# Patient Record
Sex: MALE | Race: WHITE | NOT HISPANIC OR LATINO | Employment: OTHER | URBAN - METROPOLITAN AREA
[De-identification: names, ages, dates, MRNs, and addresses within clinical notes are randomized per-mention and may not be internally consistent; named-entity substitution may affect disease eponyms.]

---

## 2021-10-05 ENCOUNTER — TRANSFERRED RECORDS (OUTPATIENT)
Dept: HEALTH INFORMATION MANAGEMENT | Facility: CLINIC | Age: 71
End: 2021-10-05

## 2021-10-13 ENCOUNTER — TRANSCRIBE ORDERS (OUTPATIENT)
Dept: OTHER | Age: 71
End: 2021-10-13

## 2021-10-13 DIAGNOSIS — D03.9 MELANOMA IN SITU (H): Primary | ICD-10-CM

## 2021-10-14 ENCOUNTER — TELEPHONE (OUTPATIENT)
Dept: DERMATOLOGY | Facility: CLINIC | Age: 71
End: 2021-10-14

## 2021-10-28 NOTE — TELEPHONE ENCOUNTER
FUTURE VISIT INFORMATION      FUTURE VISIT INFORMATION:    Date: 11.3.21    Time: 1 PM    Location:  Derm  REFERRAL INFORMATION:    Referring provider:  Og    Referring providers clinic:  [facility]    Reason for visit/diagnosis  Melanoma in situ    RECORDS REQUESTED FROM:       Clinic name Comments Records Status Imaging Status   [facility] 10.13.21 Og Received                                    Received records from [facility]. Sent to urgent scanning.

## 2021-11-03 ENCOUNTER — OFFICE VISIT (OUTPATIENT)
Dept: DERMATOLOGY | Facility: CLINIC | Age: 71
End: 2021-11-03
Payer: MEDICARE

## 2021-11-03 ENCOUNTER — PRE VISIT (OUTPATIENT)
Dept: DERMATOLOGY | Facility: CLINIC | Age: 71
End: 2021-11-03

## 2021-11-03 DIAGNOSIS — D03.62 MELANOMA IN SITU OF LEFT SHOULDER (H): Primary | ICD-10-CM

## 2021-11-03 PROCEDURE — 99203 OFFICE O/P NEW LOW 30 MIN: CPT | Mod: 57 | Performed by: DERMATOLOGY

## 2021-11-03 RX ORDER — AMLODIPINE BESYLATE 5 MG/1
TABLET ORAL
COMMUNITY
Start: 2021-10-11

## 2021-11-03 ASSESSMENT — PAIN SCALES - GENERAL: PAINLEVEL: NO PAIN (0)

## 2021-11-03 NOTE — LETTER
11/3/2021       RE: Adam Birmingham  [address] [place] [facility] [address]     Dear Colleague,    Thank you for referring your patient, Adam Birmingham, to the [facility] DERMATOLOGIC SURGERY CLINIC [place] at [facility]. Please see a copy of my visit note below.    Mohs Micrographic Surgery Consult Note    Nov 3, 2021    Dermatology Problem List:  #. 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    Skin cancer(s): Melanoma in situ  Location(s): Left anterior shoulder  Associated symptoms: pruritus, tenderness to touch  Onset: within last 1 year    No other associated symptoms, modifying factors, or prior treatments, except when noted above. The patient denies any constitutional symptoms, lymphadenopathy, unintentional weight loss or decreased appetite. No other skin concerns today.    Objective:   Gen: This is a well appearing individual in no acute distress. The patient is alert and oriented x 3.  An exam of the left shoulder was performed today and visualized the following:  - At the left shoulder, there is a well healed surgical scar with some overlying pigmented papules.     Assessment and Plan:     #. 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  Positive margins on WLE.  Meets Mohs AUC.  1. Plan for Mohs micrographic surgery for skin cancer(s) above:  - We discussed the nature of the diagnosis/condition above. We discussed the treatment options, including the risks benefits and expectations of these options. We recommend micrographic surgery as the most effective and most tissue sparing option for treatment, and the patient agrees to proceed with this.  The patient is aware of the risks, benefits and expectations of this procedure. The patient will be scheduled for this procedure, if not  already done so.  - We anticipate the following closure type: Linear closure    The patient was discussed with and evaluated by attending physician, Hiram Ponce MD.    Niles Davenport MD  Micrographic Surgery and Dermatologic Oncology (MSDO) Fellow    Scribe Disclosure:  I, Ramya Richards, am serving as a scribe to document services personally performed by Hiram Ponce MD based on data collection and the provider's statements to me.     Attending Attestation  I attest that the Fellow recorded the interview and exam that I personally performed.  I have reviewed the note and edited it as necessary.    Hiram Ponce M.D.  Professor  Director of Dermatologic Surgery  Department of Dermatology  [facility]          Again, thank you for allowing me to participate in the care of your patient.      Sincerely,    Hiram Ponce MD

## 2021-11-03 NOTE — PROGRESS NOTES
Mohs Micrographic Surgery Consult Note    Nov 3, 2021    Dermatology Problem List:  #. 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    Skin cancer(s): Melanoma in situ  Location(s): Left anterior shoulder  Associated symptoms: pruritus, tenderness to touch  Onset: within last 1 year    No other associated symptoms, modifying factors, or prior treatments, except when noted above. The patient denies any constitutional symptoms, lymphadenopathy, unintentional weight loss or decreased appetite. No other skin concerns today.    Objective:   Gen: This is a well appearing individual in no acute distress. The patient is alert and oriented x 3.  An exam of the left shoulder was performed today and visualized the following:  - At the left shoulder, there is a well healed surgical scar with some overlying pigmented papules.     Assessment and Plan:     #. 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  Positive margins on WLE.  Meets Mohs AUC.  1. Plan for Mohs micrographic surgery for skin cancer(s) above:  - We discussed the nature of the diagnosis/condition above. We discussed the treatment options, including the risks benefits and expectations of these options. We recommend micrographic surgery as the most effective and most tissue sparing option for treatment, and the patient agrees to proceed with this.  The patient is aware of the risks, benefits and expectations of this procedure. The patient will be scheduled for this procedure, if not already done so.  - We anticipate the following closure type: Linear closure    The patient was discussed with and evaluated by attending physician, Hiram Ponce MD.    Niles Davenport MD  Micrographic Surgery and Dermatologic Oncology (MSDO) Fellow    Scribe Disclosure:  Ramya KAPLAN, am serving as a scribe to document services  personally performed by Hiram Ponce MD based on data collection and the provider's statements to me.     Attending Attestation  I attest that the Fellow recorded the interview and exam that I personally performed.  I have reviewed the note and edited it as necessary.    Hiram Ponce M.D.  Professor  Director of Dermatologic Surgery  Department of Dermatology  [facility]

## 2021-11-03 NOTE — NURSING NOTE
Dermatology Rooming Note    Adam Birmingham's goals for this visit include:   Chief Complaint   Patient presents with     Derm Problem     Adam is here today for a consult for a melanoma in situ on his left anterior shoulder.      Susan Sweet CMA

## 2021-11-03 NOTE — LETTER
Date:November 7, 2021      Patient was self referred, no letter generated. Do not send.        [facility] Health Information

## 2021-11-17 ENCOUNTER — OFFICE VISIT (OUTPATIENT)
Dept: DERMATOLOGY | Facility: CLINIC | Age: 71
End: 2021-11-17
Payer: MEDICARE

## 2021-11-17 VITALS — HEART RATE: 90 BPM | DIASTOLIC BLOOD PRESSURE: 74 MMHG | SYSTOLIC BLOOD PRESSURE: 143 MMHG

## 2021-11-17 DIAGNOSIS — D03.62 MELANOMA IN SITU OF LEFT UPPER EXTREMITY INCLUDING SHOULDER (H): Primary | ICD-10-CM

## 2021-11-17 PROCEDURE — 88305 TISSUE EXAM BY PATHOLOGIST: CPT | Mod: 26 | Performed by: DERMATOLOGY

## 2021-11-17 PROCEDURE — 12034 INTMD RPR S/TR/EXT 7.6-12.5: CPT | Mod: GC | Performed by: DERMATOLOGY

## 2021-11-17 PROCEDURE — 88305 TISSUE EXAM BY PATHOLOGIST: CPT | Mod: TC | Performed by: DERMATOLOGY

## 2021-11-17 PROCEDURE — 17315 MOHS SURG ADDL BLOCK: CPT | Mod: GC | Performed by: DERMATOLOGY

## 2021-11-17 PROCEDURE — 17313 MOHS 1 STAGE T/A/L: CPT | Mod: GC | Performed by: DERMATOLOGY

## 2021-11-17 ASSESSMENT — PAIN SCALES - GENERAL: PAINLEVEL: NO PAIN (0)

## 2021-11-17 NOTE — NURSING NOTE
Dermatology Rooming Note    Adam Birmingham's goals for this visit include:   Chief Complaint   Patient presents with     Derm Problem     Adam is here today for a melanoma on the left shoulder     Scott Almanzar, JOEY

## 2021-11-17 NOTE — PROGRESS NOTES
[facility] Mohs Surgery Procedure Note    Case #: 1  Date of Service:  Nov 17, 2021  Surgery: Mohs micrographic surgery (MMS)  Staff surgeon: Hiram Ponce MD  Fellow surgeon: Niles Davenport MD  Resident surgeon: Crista Greenberg MD  Nurse: Jeannette Carrillo RN    Tumor Type: Melanoma in situ  Location: Left anterior shoulder  Derm-Path Accession #: Outside path [facility]     Mohs Accession #:   Pre-Op Size: 9.5 cm x 2.2 cm  Final Defect Size: 10.0 cm x 4.0 cm  Number of Mohs stages: 1  Level of Defect: Fat  Local anesthetic: 21 mL 1% lidocaine with epinephrine  Repair Type: Intermediate repair  Repair Size: 12.5 cm  Suture Material: 3-0 Vicryl; 4-0 Monocryl    Procedure:    Stage I  We discussed the principles of treatment and most likely complications including scarring, bleeding, infection, swelling, pain, crusting, nerve damage, large wound,  incomplete excision, wound dehiscence,  nerve damage, recurrence, and a second procedure may be recommended to obtain the best cosmetic or functional result.    Informed consent was obtained and the patient underwent the procedure as follows:  The patient was placed supine on the operating table.  The cancer was identified, outlined with a marker, and verified by the patient.  The entire surgical field was prepped with chlorhexidine.  The surgical site was anesthetized using lidocaine with epinephrine.    The area of clinically apparent tumor was debulked. The layer of tissue was then surgically excised using a #15 blade and was then transferred onto a specimen sheet maintaining the orientation of the specimen. Hemostasis was obtained using electrocoagulation. The wound site was then covered with a dressing while the tissue samples were processed for examination.    The excised tissue was transported to the Mohs histology laboratory maintaining the tissue orientation.  The tissue specimen was relaxed so that the entire surgical margin was in a a  single horizontal plane for sectioning and inked for precise mapping.  A precise reference map was drawn to reflect the sectioning of the specimen, colored inking of the margins, and orientation on the patient.  The tissue was processed using horizontal sectioning of the base and continuous peripheral margins.  The histopathologic sections were reviewed in conjunction with the reference map.    Total blocks: 8  Total slides:  40    There were no cancer cells visualized on examination, therefore Mohs surgery was complete.    REPAIR: An intermediate layered linear closure was selected as the procedure which would maximally preserve both function and cosmesis.    After the excision of the tumor, the area was carefully undermined. Hemostasis was obtained with electrocoagulation.  Closure was oriented so that the wound was parallel to the patient's relaxed skin tension lines.   The subcutaneous and dermal layers were then closed with 3-0 Vicryl sutures. The epidermis was then carefully approximated along the length of the wound using 4-0 Monocryl simple running sutures.     Estimated blood loss was less than 10 ml for all surgical sites. A sterile pressure dressing was applied and wound care instructions, with a written handout, were given. The patient was discharged from the [facility] Center alert and ambulatory.    Follow-up for suture removal: Not applicable as only dissolving sutures used    Hiram Ponce MD was immediately available for the entire surgery and was physicially present for the key portions of the procedure.    Dr. Niles Davenport (Mohs micrographic surgery fellow) performed the micrographic surgery; and Hiram Ponce MD performed the reconstruction/repair and was present for the entire micrographic surgery and always immediately available.    Scribe Disclosure:  Viktoriya KAPLAN, am serving as a scribe to document services personally performed by Hiram Ponce MD based on data collection and the  provider's statements to me.

## 2021-11-17 NOTE — PATIENT INSTRUCTIONS
Wound care    I will experience scar, bleeding, swelling, pain, crusting and redness. I may experience incomplete removal or recurrence. Risks are bleeding, bruising, swelling, infection, nerve damage, & a large wound. A second procedure may be recommended to obtain the best cosmetic or functional result.       A three month office visit with your Surgeon is recommended for scar evaluation. Please reach out sooner if you have concerns about you surgical site/wound.    Caring for your skin after surgery    After your surgery, a pressure bandage will be placed over the area that has stitches. This is important to prevent bleeding. Please follow these instructions over the next 1 to 2 weeks. Following this regimen will help to prevent complications as your wound heals.     For the first 48 hours after your surgery:      Leave the pressure dressing on and keep it dry. If it should come loose, you may re-tape it, but do not take it off.    Relax and take it easy. Do not do any vigorous exercise or heavy lifting. This could cause the wound to bleed.    Post-Operative pain is usually mild. If you are able to take tylenol, You may take plain or extra-strength Tylenol (acetaminophen) As directed on the bottle (do not take more than 4,000mg in one day). If you are able to take ibuprofen, you can alternate the tylenol and ibuprofen.     Avoid alcohol as this may increase your tendency to bleed.     You may put an ice pack around the bandaged area for 20 minutes at a time as needed. This may help reduce swelling, bruising, and pain. Make sure the ice pack is waterproof so that the pressure bandage doesn t get wet.    If the wound is on the face try to sleep with your head elevated. Either in a recliner or propped up in bed, this will decrease swelling around the eyes.     You may see a small amount of drainage or blood on your pressure bandage. This is normal. However:  o If drainage or bleeding continues or saturates the  bandage, you will need to apply firm pressure over the bandage with a piece of gauze for 15 minutes.  o If bleeding continues after applying pressure for 15 minutes, apply an ice pack to the bandaged area for 15 minutes.  o If bleeding still continues, call our office or go to the nearest emergency room.    Remove pressure dressing 48 hours after surgery:      Carefully remove the pressure bandage. If it seems sticky or too difficult to get off, you may need to soak it off in the shower.    After the pressure dressing is removed, you may shower and get the wound wet. However, Do Not let the forceful stream of the shower hit the wound directly.    Follow these wound care and dressing change instructions:  o  In the shower was the surgical site last with its own separate was cloth.  o You may allow water to run over the site. Take a clean wash cloth wet with soapy warm water and gently pat the suture site to help remove any crust or drainage.   o Do Not rub or scrub the site    o After site is clean pat dry and apply a thin layer of Vaseline ointment  over the suture site with a cotton swab or clean finger.   o Cover the suture site with Telfa (non-stick) dressing. You may tape a piece of gauze over the Telfa for extra protection if you wish.  o Continue wound care at least once a day, twice if you are active or around a contaminated environment.  o Continue daily wound care until your surgical site is completely healed. To determine this, around 2 weeks post procedure you can take a cotton swab with a small amount of Hydrogen peroxide and roll it on the suture site. If the site bubbles and turns white your wound is still healing. Please continue wound care until the area no longer bubbles up from the hydrogen peroxide.   o Dissolving stitches, if you have been told your stitches are dissolving they should dissolve in one to one and a half week. If the stiches do not dissolve by one and a half week you can use a Qtip  with hydrogen peroxide on it and roll it along the suture line to help the stitches dissolve and come out. Please give us a call if stitches are still in after two weeks. If you do not keep your wound moist at all times while it heals the dissolving sutures will dry out and not dissolve.         Follow up will be a 3 month scar evaluation either in person or via a telephone visit with you sending in a photo via [employer]. Unless you have been told to follow up sooner or if you have concerns and would like to be see sooner. Please call or send us in a [employer] message if possible and attach a photo.        What to expect:      The first couple of days your wound may be tender and may bleed slightly when doing wound care.    There may be swelling and bruising around the wound, especially if it is near the eyes. For your comfort, you may apply ice or cold compresses to the bruises after your have removed the pressure bandage.    The area around your wound may be numb for several weeks or even months.    You may experience periodic sharp pain or mild itching around the wound as it heals.     The suture line will look dark for a while but will lighten over time.       When to call us:      You have bleeding that will not stop after applying pressure and ice.    You have pain that is not controlled with Tylenol (acetaminophen.)    You have signs or symptoms of an infection such as:  o Fever over 100 degrees Fahrenheit  o Redness, warmth or foul-smelling drainage from the wound  o If you have any questions, or are not sure how to take care of the wound.    Phone numbers:    During business hours (M-F 8:00-4:30 p.m.)  [facility]-  [phone] Option 1 appt. desk  [phone]  Option 3 nurse triage line  ---------------------------------------------------------  Evenings/Weekends/Holidays  Hospital - [phone]   TTY for hearing jqxsypnq-[phone]  *Ask  to page dermatologist  on-call  Emergency Irij-[phone]  TTY for hearing impaired- [phone]

## 2021-11-17 NOTE — LETTER
11/17/2021       RE: Adam Birmingham  [address] [place] [address]     Dear Colleague,    Thank you for referring your patient, Adam Birmingham, to the [facility] DERMATOLOGIC SURGERY CLINIC [place] [facility]. Please see a copy of my visit note below.    [facility] Mohs Surgery Procedure Note    Case #: 1  Date of Service:  Nov 17, 2021  Surgery: Mohs micrographic surgery (MMS)  Staff surgeon: Hiram Ponce MD  Fellow surgeon: Niles Davenport MD  Resident surgeon: Crista Greenberg MD  Nurse: Jeannette Carrillo RN    Tumor Type: Melanoma in situ  Location: Left anterior shoulder  Derm-Path Accession #: Outside path [facility]     Mohs Accession #:   Pre-Op Size: 9.5 cm x 2.2 cm  Final Defect Size: 10.0 cm x 4.0 cm  Number of Mohs stages: 1  Level of Defect: Fat  Local anesthetic: 21 mL 1% lidocaine with epinephrine  Repair Type: Intermediate repair  Repair Size: 12.5 cm  Suture Material: 3-0 Vicryl; 4-0 Monocryl    Procedure:    Stage I  We discussed the principles of treatment and most likely complications including scarring, bleeding, infection, swelling, pain, crusting, nerve damage, large wound,  incomplete excision, wound dehiscence,  nerve damage, recurrence, and a second procedure may be recommended to obtain the best cosmetic or functional result.    Informed consent was obtained and the patient underwent the procedure as follows:  The patient was placed supine on the operating table.  The cancer was identified, outlined with a marker, and verified by the patient.  The entire surgical field was prepped with chlorhexidine.  The surgical site was anesthetized using lidocaine with epinephrine.    The area of clinically apparent tumor was debulked. The layer of tissue was then surgically excised using a #15 blade and was then transferred onto a specimen sheet maintaining the orientation of the specimen. Hemostasis was  obtained using electrocoagulation. The wound site was then covered with a dressing while the tissue samples were processed for examination.    The excised tissue was transported to the Mohs histology laboratory maintaining the tissue orientation.  The tissue specimen was relaxed so that the entire surgical margin was in a a single horizontal plane for sectioning and inked for precise mapping.  A precise reference map was drawn to reflect the sectioning of the specimen, colored inking of the margins, and orientation on the patient.  The tissue was processed using horizontal sectioning of the base and continuous peripheral margins.  The histopathologic sections were reviewed in conjunction with the reference map.    Total blocks: 8  Total slides:  40    There were no cancer cells visualized on examination, therefore Mohs surgery was complete.    REPAIR: An intermediate layered linear closure was selected as the procedure which would maximally preserve both function and cosmesis.    After the excision of the tumor, the area was carefully undermined. Hemostasis was obtained with electrocoagulation.  Closure was oriented so that the wound was parallel to the patient's relaxed skin tension lines.   The subcutaneous and dermal layers were then closed with 3-0 Vicryl sutures. The epidermis was then carefully approximated along the length of the wound using 4-0 Monocryl simple running sutures.     Estimated blood loss was less than 10 ml for all surgical sites. A sterile pressure dressing was applied and wound care instructions, with a written handout, were given. The patient was discharged from the [facility] alert and ambulatory.    Follow-up for suture removal: Not applicable as only dissolving sutures used    Hiram Ponce MD was immediately available for the entire surgery and was physicially present for the key portions of the procedure.    Dr. Niles Davenport (Mohs micrographic surgery fellow) performed the  micrographic surgery; and Hiram Ponce MD performed the reconstruction/repair and was present for the entire micrographic surgery and always immediately available.    Scribe Disclosure:  I, Viktoriya Scanlon, am serving as a scribe to document services personally performed by Hiram Ponce MD based on data collection and the provider's statements to me.     Attestation signed by Hiram Ponce MD at 11/22/2021  4:13 PM:    Attending attestation:  I was present for key elements of the procedure and immediately available for all other portions of the procedure.  I have reviewed the note and edited it as necessary.    Hiram Ponce M.D.  Professor  Director of Dermatologic Surgery  Department of Dermatology  [facility]    Dermatology Surgery Clinic  [facility] [place] [phone]

## 2021-11-29 LAB
PATH REPORT.COMMENTS IMP SPEC: NORMAL
PATH REPORT.COMMENTS IMP SPEC: NORMAL
PATH REPORT.FINAL DX SPEC: NORMAL
PATH REPORT.GROSS SPEC: NORMAL
PATH REPORT.MICROSCOPIC SPEC OTHER STN: NORMAL
PATH REPORT.RELEVANT HX SPEC: NORMAL